# Patient Record
Sex: FEMALE | Race: WHITE | URBAN - METROPOLITAN AREA
[De-identification: names, ages, dates, MRNs, and addresses within clinical notes are randomized per-mention and may not be internally consistent; named-entity substitution may affect disease eponyms.]

---

## 2020-04-30 ENCOUNTER — OFFICE VISIT (OUTPATIENT)
Dept: FAMILY MEDICINE CLINIC | Age: 30
End: 2020-04-30

## 2020-04-30 VITALS — WEIGHT: 163 LBS

## 2020-04-30 DIAGNOSIS — I10 ESSENTIAL HYPERTENSION: ICD-10-CM

## 2020-04-30 DIAGNOSIS — M79.601 PAIN IN INFERIOR RIGHT UPPER EXTREMITY: Primary | ICD-10-CM

## 2020-04-30 RX ORDER — GABAPENTIN 300 MG/1
300 CAPSULE ORAL 2 TIMES DAILY
COMMUNITY

## 2020-04-30 RX ORDER — LISINOPRIL 5 MG/1
TABLET ORAL DAILY
COMMUNITY
End: 2020-04-30 | Stop reason: SDUPTHER

## 2020-04-30 RX ORDER — AMITRIPTYLINE HYDROCHLORIDE 25 MG/1
TABLET, FILM COATED ORAL
Qty: 60 TAB | Refills: 0 | Status: SHIPPED | OUTPATIENT
Start: 2020-04-30 | End: 2020-05-19 | Stop reason: DRUGHIGH

## 2020-04-30 RX ORDER — LISINOPRIL 5 MG/1
5 TABLET ORAL DAILY
Qty: 60 TAB | Refills: 0 | Status: SHIPPED | OUTPATIENT
Start: 2020-04-30 | End: 2020-05-19 | Stop reason: DRUGHIGH

## 2020-04-30 NOTE — PROGRESS NOTES
4/30/2020 [address] [place] [address] consented to Telephone visit. Patient speaks Chilean. : Grayson Parker  Assessment/Plan:       ICD-10-CM ICD-9-CM    1. Pain in inferior right upper extremity M79.601 729.5 amitriptyline (ELAVIL) 25 mg tablet   2. Essential hypertension I10 401.9 lisinopriL (PRINIVIL, ZESTRIL) 5 mg tablet     Follow-up and Dispositions    · Return in about 2 weeks (around 5/14/2020) for 2-3 weeks for HTN. Subjective:   Ijeoma Jacome is a 34 y.o. WHITE OR  female. She is a new patient to us. Chief Complaint   Patient presents with    Swelling     weakness right hand along with numbness from shoulder down to the leg per 1 month was treated in [facility]    Numbness     hands, feet, tongue     HPI: numbing of legs, arms, tongue at times. The right hand hurts the most - sleeping, swollen. From the shoulders on down. This started a month ago, had a consultation a month ago in [place], given some medicine but it hurt her heart. Denies focal weakness. No change in gait. Has pain in leg with walking. FROM right arm without weakness but notes pain with moving the arm. Ibuprofen didn't work but gabapentin was better. Has normal speech. Given lisinopril 5 mg.- it finished and she doesn't have any refills. Denies pregnancy; doesn't take anything, but her  uses condoms. ROS: positive for pain, insomnia   No dyspnea or chest pain on exertion. No abdominal pain, change in bowel habits, black or bloody stools. No urinary tract symptoms. reports that she has never smoked. She has never used smokeless tobacco. She reports that she does not drink alcohol or use drugs. Objective:   Self-reported weight. Patient does not know her height. Vitals:    04/30/20 1550   Weight: 163 lb (73.9 kg)   Physical exam not performed due to telephone visit. Assessment/Plan:   Diagnoses and all orders for this visit:    1.  Pain in inferior right upper extremity  -     amitriptyline (ELAVIL) 25 mg tablet; Take 1 tab po qhs for 1 week, then increase to 2 po qhs for arm and leg pain    2. Essential hypertension  -     lisinopriL (PRINIVIL, ZESTRIL) 5 mg tablet; Take 1 Tab by mouth daily. For blood pressure. Rocío Tejeda, NURA Graf expressed understanding of this plan.

## 2020-04-30 NOTE — PROGRESS NOTES
Check-out Note: She has been in [place] for 8 days and hasn't taken lisinopril for more than 3 weeks. Macy Altman is to measure her blood pressures twice a week and follow up with us in 2 weeks to check up on her symptoms.  IF CHEST PAIN/SHORTNESS OF BREATH/CAN'T MOVE ARM OR LEG/SEIZURES need to go by squad to ER.  Reporting her blood pressure was \"190 over something\" and she was not told to go to the ER based upon this. -bolded instructions on the check out note was discussed with patient per provider instructions. Discharge nurse encounter completed via telephoned call. Name and  verified. AVS, prescription and pharmacy location reviewed . Goodrx coupon code sent via text per patient's request. Patient to follow up in 2 weeks; message has been sent to registration. This has been fully explained to the patient, who indicates understanding and agrees with plan. No further questions at this time.  Patricio De Dios, RN

## 2020-05-19 ENCOUNTER — OFFICE VISIT (OUTPATIENT)
Dept: FAMILY MEDICINE CLINIC | Age: 30
End: 2020-05-19

## 2020-05-19 ENCOUNTER — TELEPHONE (OUTPATIENT)
Dept: FAMILY MEDICINE CLINIC | Age: 30
End: 2020-05-19

## 2020-05-19 DIAGNOSIS — G56.01 CARPAL TUNNEL SYNDROME ON RIGHT: Primary | ICD-10-CM

## 2020-05-19 DIAGNOSIS — R05.8 DRY COUGH: ICD-10-CM

## 2020-05-19 DIAGNOSIS — I10 ESSENTIAL HYPERTENSION: ICD-10-CM

## 2020-05-19 RX ORDER — ALBUTEROL SULFATE 90 UG/1
AEROSOL, METERED RESPIRATORY (INHALATION)
Qty: 1 INHALER | Refills: 1 | Status: SHIPPED | OUTPATIENT
Start: 2020-05-19 | End: 2020-07-31 | Stop reason: SDUPTHER

## 2020-05-19 RX ORDER — AMITRIPTYLINE HYDROCHLORIDE 75 MG/1
75 TABLET, FILM COATED ORAL
Qty: 60 TAB | Refills: 0 | Status: SHIPPED | OUTPATIENT
Start: 2020-05-19

## 2020-05-19 RX ORDER — BENZONATATE 100 MG/1
100 CAPSULE ORAL
Qty: 30 CAP | Refills: 1 | Status: SHIPPED | OUTPATIENT
Start: 2020-05-19 | End: 2020-08-01

## 2020-05-19 RX ORDER — LISINOPRIL 10 MG/1
10 TABLET ORAL DAILY
Qty: 60 TAB | Refills: 0 | Status: SHIPPED | OUTPATIENT
Start: 2020-05-19 | End: 2020-07-31 | Stop reason: DRUGHIGH

## 2020-05-19 NOTE — PROGRESS NOTES
Coordination of Care  1. Have you been to the ER, urgent care clinic since your last visit? Hospitalized since your last visit? No    2. Have you seen or consulted any other health care providers outside of the [address] [place] [address] since your last visit? Include any pap smears or colon screening. No    Does the patient need refills? YES    Learning Assessment Complete?  yes

## 2020-05-19 NOTE — TELEPHONE ENCOUNTER
Chart reviewed. Appt. For f/up in 2 months have been scheduled. goodrx coupons sent via text with patient's verbal permission, instructed to show at the pharmacy. Medication instructions reviewed with patient. This has been fully explained to the patient, who indicates understanding and agrees with plan. No further questions at this time.  Ahmet Paulino RN

## 2020-05-19 NOTE — PROGRESS NOTES
Shailesh Laws is a 34 y.o. female evaluated via telephone at [phone] on 2020. Patient identification verified with 2 identifiers. Consent: She and/or health care decision maker has provided verbal consent to proceed: Yes Pursuant to the emergency declaration under the [facility], [address] [place] [address] and the [employer] and [employer] Act, this Virtual Telephone Visit was conducted to reduce the patient's risk of exposure to COVID-19. : Kennedi  Chief Complaint   Patient presents with    Hypertension     f/u    Numbness     f/u      HPI  blood pressure measurements: 20, 142/96; 20, 140/96. With the addition of amitriptyline 50 mg the pain in her hands decreased. Her hands still feel asleep when she cooks and at night, especially the right hand. She has never heard of carpal tunnel syndrome. ROS no difficulty breathing, no chest pain, no shortness of breath  Documentation:  I communicated with the patient and/or health care decision maker about: blood pressures not controlled, increase lisinopril to 10 mg po qday. Consider a face to face visit to confirm suspected diagnosis of carpal tunnel syndrome, especially if no improvement over time. Diagnoses and all orders for this visit:    1. Carpal tunnel syndrome on right  -     amitriptyline (ELAVIL) 75 mg tablet; Take 1 Tab by mouth nightly. 2. Essential hypertension  -     lisinopriL (PRINIVIL, ZESTRIL) 10 mg tablet; Take 1 Tab by mouth daily. 3. Dry cough  -     benzonatate (TESSALON) 100 mg capsule; Take 1 Cap by mouth nightly as needed for Cough. Afghan sig  -     albuterol (PROVENTIL HFA, VENTOLIN HFA, PROAIR HFA) 90 mcg/actuation inhaler; Disp:8g Canister of 90 mcg inhaler Si inh qhs prn, Sig: Afghan please      Follow-up and Dispositions    · Return in about 2 months (around 2020).        Details of this discussion including any medical advice provided: buy a carpal tunnel wrist splint for the right wrist, continue current medications but increase lisinopril to 10 mg daily, increase amitriptyline to 75 mg daily due to pain has decreased some but there is room for improvement; previously was on tessalon and inhaler for a dry cough that started before she started lisinopril and improved with these medications. Total Time: minutes: 21-30 minutes  I affirm this is a Patient Initiated Episode with a Patient who has not had a related appointment within my department in the past 7 days or scheduled within the next 24 hours.   Luis Chiu NP

## 2020-05-19 NOTE — Clinical Note
recommend Face-to-Face visit in 2 months; if not available, video or phone visit LK 2 months; coupons for medications please

## 2020-07-31 ENCOUNTER — OFFICE VISIT (OUTPATIENT)
Dept: FAMILY MEDICINE CLINIC | Age: 30
End: 2020-07-31

## 2020-07-31 VITALS — WEIGHT: 155 LBS

## 2020-07-31 DIAGNOSIS — R05.8 DRY COUGH: ICD-10-CM

## 2020-07-31 DIAGNOSIS — I10 ESSENTIAL HYPERTENSION: Primary | ICD-10-CM

## 2020-07-31 RX ORDER — ALBUTEROL SULFATE 90 UG/1
AEROSOL, METERED RESPIRATORY (INHALATION)
Qty: 1 INHALER | Refills: 1 | Status: SHIPPED | OUTPATIENT
Start: 2020-07-31

## 2020-07-31 RX ORDER — LISINOPRIL 5 MG/1
5 TABLET ORAL DAILY
Qty: 30 TAB | Refills: 1 | Status: SHIPPED | OUTPATIENT
Start: 2020-07-31

## 2020-07-31 RX ORDER — HYDROCHLOROTHIAZIDE 12.5 MG/1
12.5 TABLET ORAL DAILY
Qty: 30 TAB | Refills: 1 | Status: SHIPPED | OUTPATIENT
Start: 2020-07-31

## 2020-07-31 NOTE — PROGRESS NOTES
Avs discussed with Max Dan by Discharge Nurse Kaila Cortez LPN. Discussed medications prescribed today, good rx coupon sent to pt via text msg. Patient verbalized understanding and has no further questions.  AVS printed and given to patient Kaila Cortez LPN

## 2020-07-31 NOTE — PROGRESS NOTES
Luz Moralez is a 27 y.o. female evaluated via telephone at [phone] on 7/31/2020. Patient identification verified with 2 identifiers. Consent: She and/or health care decision maker has provided verbal consent to proceed: Yes Pursuant to the emergency declaration under the [address], [facility] and the [employer] and [employer] Act, this Virtual Telephone Visit was conducted to reduce the patient's risk of exposure to COVID-19. : Rafita Landry  Chief Complaint   Patient presents with    Follow Up Chronic Condition    Medication Evaluation     pt stated BP medicine is increasing her heart rate and also headaches x 2 weeks    Medication Refill     HPI needs refills. From last time: increased lisinopril to 10 mg daily, increase amitriptyline to 75 mg daily due to pain has decreased some but there is room for improvement. Lisinopril 10 mg prescribed end of May. Her cough is the same, still cough. Albuterol helps the cough and lets her sleep. Using albuterol a little more in the last 2 weeks. Measured blood pressures have been 193 or 194 over 96. States she was previously on lisinopril 5 mg for a month and her blood pressure measured 034 systolic/unknown diastolic. The pill for cough doesn't really help. She has not had fever or shortness of breath. She has been coughing since the end of January. She hadn't started lisinopril yet. Her increased heart rate corresponds with the increased use of albuterol in the last 2 weeks which helps her cough. Documentation:  I communicated with the patient and/or health care decision maker about:  Diagnoses and all orders for this visit:    1. Essential hypertension  -     lisinopriL (PRINIVIL, ZESTRIL) 5 mg tablet; Take 1 Tab by mouth daily. For blood pressure. Egyptian sig  -     hydroCHLOROthiazide (HYDRODIURIL) 12.5 mg tablet; Take 1 Tab by mouth daily.   - METABOLIC PANEL, COMPREHENSIVE; Future    2. Dry cough  -     albuterol (PROVENTIL HFA, VENTOLIN HFA, PROAIR HFA) 90 mcg/actuation inhaler; Disp:8g Canister of 90 mcg inhaler Si inh qhs prn, Sig: Samoan please  -     CBC WITH AUTOMATED DIFF; Future    Details of this discussion including any medical advice provided: will decrease lisinopril 10 mg back down to lisinopril 5 mg, add HCTZ 12.5 mg  Return soon F2F and need labs within 1 month  Goodrx for albuterol. 1 month urgent labs nonfasting ordered above and F2F appointment LK (can be on the same day). Total Time: minutes: 11-20 minutes   I affirm this is a Patient Initiated Episode with a Patient who has not had a related appointment within my department in the past 7 days or scheduled within the next 24 hours. NURA Santos expressed understanding and agreed to this plan.

## 2020-07-31 NOTE — PROGRESS NOTES
Coordination of Care  1. Have you been to the ER, urgent care clinic since your last visit? Hospitalized since your last visit? No    2. Have you seen or consulted any other health care providers outside of the [address] [place] [address] since your last visit? Include any pap smears or colon screening. No    Does the patient need refills? YES    Learning Assessment Complete?  yes  Depression Screening complete in the past 12 months? yes

## 2020-08-17 ENCOUNTER — TELEPHONE (OUTPATIENT)
Dept: FAMILY MEDICINE CLINIC | Age: 30
End: 2020-08-17

## 2020-08-17 NOTE — TELEPHONE ENCOUNTER
I called patient to offer a F2F, and non fasting urgent labs for 8/27/20 with AMADEO. Patient stated she will not be available that day, so she will call the office to schedule when she is able to make it.

## 2023-05-19 RX ORDER — AMITRIPTYLINE HYDROCHLORIDE 75 MG/1
75 TABLET, FILM COATED ORAL
COMMUNITY
Start: 2020-05-19

## 2023-05-19 RX ORDER — HYDROCHLOROTHIAZIDE 12.5 MG/1
12.5 TABLET ORAL DAILY
COMMUNITY
Start: 2020-07-31

## 2023-05-19 RX ORDER — ALBUTEROL SULFATE 90 UG/1
AEROSOL, METERED RESPIRATORY (INHALATION)
COMMUNITY
Start: 2020-07-31

## 2023-05-19 RX ORDER — GABAPENTIN 300 MG/1
300 CAPSULE ORAL 2 TIMES DAILY
COMMUNITY

## 2023-05-19 RX ORDER — LISINOPRIL 5 MG/1
5 TABLET ORAL DAILY
COMMUNITY
Start: 2020-07-31